# Patient Record
(demographics unavailable — no encounter records)

---

## 2024-12-09 NOTE — HISTORY OF PRESENT ILLNESS
[[email or web address]] : CC: cough  Started about one week ago. Initially yellowish mucus. Now it is mostly dry, but this morning she had green mucus. + frequent throat clearing. No fever. No dyspnea. No CP, No sinus congestion. No GERD. Otherwise, feeling well.  Using flonase and zyrtec.

## 2024-12-09 NOTE — PHYSICAL EXAM
[No Lymphadenopathy] : no lymphadenopathy [Normal] : normal rate, regular rhythm, normal S1 and S2 and no murmur heard [de-identified] : no sinus tenderness